# Patient Record
Sex: FEMALE | Race: BLACK OR AFRICAN AMERICAN | NOT HISPANIC OR LATINO | Employment: FULL TIME | URBAN - METROPOLITAN AREA
[De-identification: names, ages, dates, MRNs, and addresses within clinical notes are randomized per-mention and may not be internally consistent; named-entity substitution may affect disease eponyms.]

---

## 2023-12-03 ASSESSMENT — PROMIS GLOBAL HEALTH SCALE
RATE_AVERAGE_PAIN: 8
EMOTIONAL_PROBLEMS: OFTEN
RATE_SOCIAL_SATISFACTION: GOOD
RATE_PHYSICAL_HEALTH: GOOD
RATE_AVERAGE_FATIGUE: MODERATE
CARRYOUT_PHYSICAL_ACTIVITIES: MODERATELY
RATE_GENERAL_HEALTH: FAIR
CARRYOUT_SOCIAL_ACTIVITIES: VERY GOOD
RATE_MENTAL_HEALTH: FAIR
RATE_QUALITY_OF_LIFE: VERY GOOD

## 2023-12-05 ENCOUNTER — OFFICE VISIT (OUTPATIENT)
Dept: PRIMARY CARE | Facility: CLINIC | Age: 43
End: 2023-12-05
Payer: COMMERCIAL

## 2023-12-05 VITALS
SYSTOLIC BLOOD PRESSURE: 137 MMHG | WEIGHT: 283.5 LBS | RESPIRATION RATE: 16 BRPM | HEIGHT: 69 IN | DIASTOLIC BLOOD PRESSURE: 72 MMHG | HEART RATE: 70 BPM | OXYGEN SATURATION: 100 % | TEMPERATURE: 97.8 F | BODY MASS INDEX: 41.99 KG/M2

## 2023-12-05 DIAGNOSIS — Z87.09 HISTORY OF ASTHMA: ICD-10-CM

## 2023-12-05 DIAGNOSIS — G89.29 CHRONIC MIDLINE LOW BACK PAIN WITHOUT SCIATICA: ICD-10-CM

## 2023-12-05 DIAGNOSIS — Z83.79 FAMILY HISTORY OF GERD: ICD-10-CM

## 2023-12-05 DIAGNOSIS — Z86.19 HISTORY OF HERPES GENITALIS: Primary | ICD-10-CM

## 2023-12-05 DIAGNOSIS — M54.50 CHRONIC MIDLINE LOW BACK PAIN WITHOUT SCIATICA: ICD-10-CM

## 2023-12-05 PROCEDURE — 99214 OFFICE O/P EST MOD 30 MIN: CPT | Performed by: STUDENT IN AN ORGANIZED HEALTH CARE EDUCATION/TRAINING PROGRAM

## 2023-12-05 PROCEDURE — 90471 IMMUNIZATION ADMIN: CPT | Performed by: STUDENT IN AN ORGANIZED HEALTH CARE EDUCATION/TRAINING PROGRAM

## 2023-12-05 PROCEDURE — 1036F TOBACCO NON-USER: CPT | Performed by: STUDENT IN AN ORGANIZED HEALTH CARE EDUCATION/TRAINING PROGRAM

## 2023-12-05 PROCEDURE — 90686 IIV4 VACC NO PRSV 0.5 ML IM: CPT | Performed by: STUDENT IN AN ORGANIZED HEALTH CARE EDUCATION/TRAINING PROGRAM

## 2023-12-05 RX ORDER — FAMOTIDINE 20 MG/1
20 TABLET, FILM COATED ORAL 2 TIMES DAILY
Qty: 120 TABLET | Refills: 3 | Status: SHIPPED | OUTPATIENT
Start: 2023-12-05

## 2023-12-05 RX ORDER — VALACYCLOVIR HYDROCHLORIDE 500 MG/1
500 TABLET, FILM COATED ORAL DAILY
Qty: 30 TABLET | Refills: 11 | Status: SHIPPED | OUTPATIENT
Start: 2023-12-05

## 2023-12-05 RX ORDER — ALBUTEROL SULFATE 0.83 MG/ML
2.5 SOLUTION RESPIRATORY (INHALATION) 4 TIMES DAILY PRN
Qty: 3 ML | Refills: 3 | Status: SHIPPED | OUTPATIENT
Start: 2023-12-05 | End: 2024-12-04

## 2023-12-05 RX ORDER — FLUTICASONE PROPIONATE AND SALMETEROL 500; 50 UG/1; UG/1
1 POWDER RESPIRATORY (INHALATION)
Qty: 60 EACH | Refills: 11 | Status: SHIPPED | OUTPATIENT
Start: 2023-12-05 | End: 2024-12-04

## 2023-12-05 RX ORDER — NAPROXEN 500 MG/1
500 TABLET ORAL 2 TIMES DAILY PRN
Qty: 60 TABLET | Refills: 0 | Status: SHIPPED | OUTPATIENT
Start: 2023-12-05 | End: 2024-03-04

## 2023-12-05 ASSESSMENT — PAIN SCALES - GENERAL: PAINLEVEL: 6

## 2023-12-05 NOTE — PROGRESS NOTES
I saw and evaluated the patient. I personally obtained the key and critical portions of the history and physical exam or was physically present for key and critical portions performed by the resident/fellow. I reviewed the resident/fellow's documentation and discussed the patient with the resident/fellow. I agree with the resident/fellow's medical decision making as documented in the note.    Karl Pina MD

## 2023-12-05 NOTE — PROGRESS NOTES
"Subjective   Patient ID: Mariposa Singh is a 43 y.o. female with PMH of genital herpes, GERD, asthma, obesity, IBS who presents for medication refill, flu vaccine, groin pain, back pain, and request for nebulizer.       HPI     #Groin pain  First diagnosed in 2010 with genital herpes and has been taking Valtrex regularly, but will have outbreak when she runs out of medications or feels stressed. Says that normally around this time of year, always gets outbreak of her genital herpes. She sometimes needs to go to the ED for treatment because she has run out of Valtrex. She ran out of Valtrex for this past month. Currently sore and painful on the groin, which just started yesterday. She notes that it feels just like previous times of herpes outbreak. Normally soreness lasts a couple days before the rest of the symptoms start. She would like refill on her valtrex.     #Back pain  Has been feeling a tight pain in the thoracic spine area (from top to middle of back) for the last 6 months. Rates pain 5/10, nonradiating. She will attempt to \"pop\" her back by laying on bed with top half hanging off of bed and stretching upwards. She sometimes feels a lot of pain when it pops, but other times feels relief after it pops. She has taken tylenol but to no relief. Tip of fingers feel numb around same time as start of back pain, left shoulder feels sore sometimes. Patient does not previous fracturing of left wrist. Denies changes to sensation no tingling.     #Asthma  Takes albuterol daily and Symbicort for asthma exacerbations. Sometimes uses son or brother's nebulizer machine but has never had one of her own- was always given the medication without the machine. Says that she would like a nebulizer machine because during visits to the ED for asthma attacks, that is how they have treated her.       #GERD  Would like refill of famotidine     #Health Maintenance  Would like flu vaccination today       Review of Systems  Negative aside " "from those in HPI.     Objective   /72 (BP Location: Right arm, Patient Position: Sitting, BP Cuff Size: Adult)   Pulse 70   Temp 36.6 °C (97.8 °F) (Temporal)   Resp 16   Ht 1.753 m (5' 9\")   Wt 129 kg (283 lb 8 oz)   SpO2 100%   BMI 41.87 kg/m²       Physical Exam  Constitutional:       Appearance: Normal appearance. She is obese.   Cardiovascular:      Rate and Rhythm: Normal rate and regular rhythm.      Pulses: Normal pulses.      Heart sounds: Normal heart sounds.   Pulmonary:      Effort: Pulmonary effort is normal.      Breath sounds: Normal breath sounds.   Musculoskeletal:         General: Normal range of motion in spine.      Pain to palpation in the mid upper spine (T spine area)   Skin:     Capillary Refill: Capillary refill takes less than 2 seconds.   Neurological:      Mental Status: She is alert.      Sensation to light touch intact in all fingers     Negative phalen's test bilaterally. Tinel's test positive in left hand (patient says it \"feels weird\")     Assessment/Plan   Mariposa Singh is a 43 y.o. female with PMH of genital herpes, GERD, asthma, obesity, IBS who has been having some groin and back pain and would like medication refills. Groin pain seems to be consistent with genital herpes outbreak, as she has run out of Valtrex for the past month and feels that the symptoms are similar to her previous outbreaks. Back pain may be degenerative disc vs disc herniation vs related to body habitus. Due to symptoms of finger numbness, imaging will be necessary to determine possible nerve compression. However, other differentials for finger numbness may include undiagnosed diabetes or tendinitis (previously was diagnosed in right wrist, and Tinel's test positive in left today although phalen's was negative). Patient was counseled on appropriate asthma medication use (using symbicort/advair daily and for exacerbations, and no longer taking albuterol).     Diagnoses and all orders for this " visit:    #Herpes Genitalis  -     continue with valACYclovir (Valtrex) 500 mg tablet; Take 1 tablet (500 mg) by mouth once daily.  Refilled today     #Asthma  Mild persistent, stable, chronic   -     Start fluticasone propion-salmeteroL (Advair Diskus) 500-50 mcg/dose diskus inhaler; Inhale 1 puff 2 times a day. Rinse mouth with water after use to reduce aftertaste and incidence of candidiasis. Do not swallow.  Advair to be used as controller and reliever medication   -     albuterol 2.5 mg /3 mL (0.083 %) nebulizer solution; Take 3 mL (2.5 mg) by nebulization 4 times a day as needed for wheezing or shortness of breath. (Refilled today)  -     miscellaneous medical supply misc; 1 each once daily as needed (for shortness of breathing and wheezing). Use nebulizer machine very 6 hours as needed for asthma symptom  Medically indicated for patient to use nebulizer machine for management of symptoms     #Upper Back Pain  Sub acute, uncontrolled  -     Start naproxen (Naprosyn) 500 mg tablet; Take 1 tablet (500 mg) by mouth 2 times a day as needed for mild pain (1 - 3) (pain).  -     XR cervical spine complete 4-5 views; Future  -     XR thoracic spine complete 4+ views; Future    #GERD  -     Continue with famotidine (Pepcid) 20 mg tablet; Take 1 tablet (20 mg) by mouth 2 times a day.  Refilled today     #HM  -     Flu vaccine administered today      Britt Pineda, MS3    I saw and evaluated the patient. I personally obtained the key and critical portions of the history and physical exam. I reviewed and modified the student's documentation of the HPI and discussed the patient with the medical student. I agree with the above documentation of the HPI.    Patient seen and discussed with attending, Dr. Silvana Fernandez MD  Family Medicine, PGY-3

## 2024-01-19 ENCOUNTER — APPOINTMENT (OUTPATIENT)
Dept: PRIMARY CARE | Facility: CLINIC | Age: 44
End: 2024-01-19
Payer: COMMERCIAL

## 2024-11-09 ENCOUNTER — APPOINTMENT (OUTPATIENT)
Dept: RADIOLOGY | Facility: HOSPITAL | Age: 44
End: 2024-11-09
Payer: COMMERCIAL

## 2024-11-09 ENCOUNTER — HOSPITAL ENCOUNTER (EMERGENCY)
Facility: HOSPITAL | Age: 44
Discharge: HOME | End: 2024-11-10
Attending: STUDENT IN AN ORGANIZED HEALTH CARE EDUCATION/TRAINING PROGRAM
Payer: COMMERCIAL

## 2024-11-09 ENCOUNTER — CLINICAL SUPPORT (OUTPATIENT)
Dept: EMERGENCY MEDICINE | Facility: HOSPITAL | Age: 44
End: 2024-11-09
Payer: COMMERCIAL

## 2024-11-09 VITALS
RESPIRATION RATE: 18 BRPM | SYSTOLIC BLOOD PRESSURE: 130 MMHG | DIASTOLIC BLOOD PRESSURE: 89 MMHG | OXYGEN SATURATION: 100 % | TEMPERATURE: 97 F | HEART RATE: 82 BPM

## 2024-11-09 DIAGNOSIS — U07.1 COVID-19: Primary | ICD-10-CM

## 2024-11-09 DIAGNOSIS — J45.901 EXACERBATION OF ASTHMA, UNSPECIFIED ASTHMA SEVERITY, UNSPECIFIED WHETHER PERSISTENT (HHS-HCC): ICD-10-CM

## 2024-11-09 PROCEDURE — 94640 AIRWAY INHALATION TREATMENT: CPT

## 2024-11-09 PROCEDURE — 99285 EMERGENCY DEPT VISIT HI MDM: CPT | Mod: 25

## 2024-11-09 PROCEDURE — 93005 ELECTROCARDIOGRAM TRACING: CPT

## 2024-11-09 PROCEDURE — 2500000002 HC RX 250 W HCPCS SELF ADMINISTERED DRUGS (ALT 637 FOR MEDICARE OP, ALT 636 FOR OP/ED): Mod: SE | Performed by: EMERGENCY MEDICINE

## 2024-11-09 PROCEDURE — 99285 EMERGENCY DEPT VISIT HI MDM: CPT

## 2024-11-09 RX ORDER — PREDNISONE 20 MG/1
40 TABLET ORAL ONCE
Status: COMPLETED | OUTPATIENT
Start: 2024-11-09 | End: 2024-11-10

## 2024-11-09 RX ORDER — ALBUTEROL SULFATE 0.83 MG/ML
2.5 SOLUTION RESPIRATORY (INHALATION) ONCE
Status: COMPLETED | OUTPATIENT
Start: 2024-11-09 | End: 2024-11-09

## 2024-11-09 RX ORDER — IPRATROPIUM BROMIDE AND ALBUTEROL SULFATE 2.5; .5 MG/3ML; MG/3ML
3 SOLUTION RESPIRATORY (INHALATION)
Status: COMPLETED | OUTPATIENT
Start: 2024-11-09 | End: 2024-11-10

## 2024-11-09 RX ADMIN — ALBUTEROL SULFATE 2.5 MG: 2.5 SOLUTION RESPIRATORY (INHALATION) at 23:15

## 2024-11-09 ASSESSMENT — LIFESTYLE VARIABLES
HAVE PEOPLE ANNOYED YOU BY CRITICIZING YOUR DRINKING: NO
HAVE YOU EVER FELT YOU SHOULD CUT DOWN ON YOUR DRINKING: NO
EVER FELT BAD OR GUILTY ABOUT YOUR DRINKING: NO
TOTAL SCORE: 0
EVER HAD A DRINK FIRST THING IN THE MORNING TO STEADY YOUR NERVES TO GET RID OF A HANGOVER: NO

## 2024-11-09 ASSESSMENT — COLUMBIA-SUICIDE SEVERITY RATING SCALE - C-SSRS
2. HAVE YOU ACTUALLY HAD ANY THOUGHTS OF KILLING YOURSELF?: NO
1. IN THE PAST MONTH, HAVE YOU WISHED YOU WERE DEAD OR WISHED YOU COULD GO TO SLEEP AND NOT WAKE UP?: NO
6. HAVE YOU EVER DONE ANYTHING, STARTED TO DO ANYTHING, OR PREPARED TO DO ANYTHING TO END YOUR LIFE?: NO

## 2024-11-09 ASSESSMENT — PAIN - FUNCTIONAL ASSESSMENT: PAIN_FUNCTIONAL_ASSESSMENT: 0-10

## 2024-11-09 ASSESSMENT — PAIN SCALES - GENERAL: PAINLEVEL_OUTOF10: 0 - NO PAIN

## 2024-11-09 NOTE — Clinical Note
Mariposa Singh was seen and treated in our emergency department on 11/9/2024.  She may return to work on 11/11/2024.       If you have any questions or concerns, please don't hesitate to call.      Gulshan Guthrie PA-C

## 2024-11-10 ENCOUNTER — APPOINTMENT (OUTPATIENT)
Dept: RADIOLOGY | Facility: HOSPITAL | Age: 44
End: 2024-11-10
Payer: COMMERCIAL

## 2024-11-10 LAB
ATRIAL RATE: 74 BPM
FLUAV RNA RESP QL NAA+PROBE: NOT DETECTED
FLUBV RNA RESP QL NAA+PROBE: NOT DETECTED
P AXIS: 52 DEGREES
P OFFSET: 197 MS
P ONSET: 138 MS
PR INTERVAL: 160 MS
Q ONSET: 218 MS
QRS COUNT: 12 BEATS
QRS DURATION: 80 MS
QT INTERVAL: 400 MS
QTC CALCULATION(BAZETT): 444 MS
QTC FREDERICIA: 429 MS
R AXIS: 59 DEGREES
SARS-COV-2 RNA RESP QL NAA+PROBE: DETECTED
T AXIS: 25 DEGREES
T OFFSET: 418 MS
VENTRICULAR RATE: 74 BPM

## 2024-11-10 PROCEDURE — 71046 X-RAY EXAM CHEST 2 VIEWS: CPT | Performed by: RADIOLOGY

## 2024-11-10 PROCEDURE — 2500000002 HC RX 250 W HCPCS SELF ADMINISTERED DRUGS (ALT 637 FOR MEDICARE OP, ALT 636 FOR OP/ED): Mod: SE

## 2024-11-10 PROCEDURE — 87636 SARSCOV2 & INF A&B AMP PRB: CPT

## 2024-11-10 PROCEDURE — 71046 X-RAY EXAM CHEST 2 VIEWS: CPT

## 2024-11-10 PROCEDURE — 2500000004 HC RX 250 GENERAL PHARMACY W/ HCPCS (ALT 636 FOR OP/ED): Mod: SE

## 2024-11-10 RX ORDER — DEXAMETHASONE 6 MG/1
6 TABLET ORAL DAILY
Qty: 5 TABLET | Refills: 0 | Status: SHIPPED | OUTPATIENT
Start: 2024-11-10 | End: 2024-11-15

## 2024-11-10 RX ADMIN — IPRATROPIUM BROMIDE AND ALBUTEROL SULFATE 3 ML: .5; 3 SOLUTION RESPIRATORY (INHALATION) at 00:30

## 2024-11-10 RX ADMIN — PREDNISONE 40 MG: 20 TABLET ORAL at 00:14

## 2024-11-10 RX ADMIN — IPRATROPIUM BROMIDE AND ALBUTEROL SULFATE 3 ML: .5; 3 SOLUTION RESPIRATORY (INHALATION) at 00:14

## 2024-11-10 RX ADMIN — IPRATROPIUM BROMIDE AND ALBUTEROL SULFATE 3 ML: .5; 3 SOLUTION RESPIRATORY (INHALATION) at 00:43

## 2024-11-10 NOTE — ED TRIAGE NOTES
Pt woke up with sob and chest pressure with being active, pt is talking in full sentences drove self from work, at work got a nebulizer and had no relief.

## 2024-11-10 NOTE — DISCHARGE INSTRUCTIONS
Please follow-up with your primary care provider as needed.  Prescription for Decadron for improvement of symptoms has been included in discharge paperwork.  Please take medication as prescribed.  Return to the emergency department if shortness of breath returns and worsens, cough worsens, have persistent fevers, or any new symptoms began.

## 2024-11-10 NOTE — ED PROVIDER NOTES
"HPI   Chief Complaint   Patient presents with    Shortness of Breath       HPI    Mariposa Singh is a 44-year-old female with history of asthma presenting the ED with shortness of breath.  Patient states the shortness of breath began this morning when she woke up.  Patient states she took a nebulizer treatment at home and the shortness of breath did not improve.  Patient states at work around 7 PM today she began having shortness of breath again as well as a dry cough.  Patient states she was given a nebulizer treatment at work which did improve the shortness of breath and cough.  However patient states while in the ED she began having shortness of breath and dry cough again.  Patient states she has \"chest pressure\" with coughing.  Patient denies abdominal pain, nausea, vomiting, fevers, body aches, chills, lower extremity swelling,  Sore throat, lower extremity swelling.    Patient History   Past Medical History:   Diagnosis Date    Chlamydial infection, unspecified     Chlamydia    Encounter for follow-up examination after completed treatment for conditions other than malignant neoplasm 02/05/2018    Postop check    Encounter for gynecological examination (general) (routine) without abnormal findings 10/30/2017    Encounter for annual routine gynecological examination    Encounter for immunization 07/21/2013    Need for DTP vaccine    Encounter for other preprocedural examination 12/21/2017    Pre-op evaluation    Inconclusive mammogram 10/21/2021    Inconclusive mammogram    Marginal corneal ulcer, unspecified eye 09/13/2019    Corneal ulcer, marginal    Other abnormal and inconclusive findings on diagnostic imaging of breast 07/11/2022    Abnormal mammogram of left breast    Other conditions influencing health status     Termination of pregnancy    Other conditions influencing health status     History of pregnancy    Other specified disorders of eye and adnexa     Red eyes    Other specified postprocedural " states 03/06/2018    Postoperative state    Pain in left shoulder 02/24/2014    Pain in joint of left shoulder    Personal history of diseases of the blood and blood-forming organs and certain disorders involving the immune mechanism 12/11/2017    History of anemia    Personal history of other benign neoplasm 12/21/2017    History of uterine leiomyoma    Personal history of other diseases of the digestive system     History of gastroesophageal reflux (GERD)    Personal history of other diseases of the female genital tract 10/30/2017    History of breast pain    Personal history of other diseases of the female genital tract 09/13/2019    History of menorrhagia    Personal history of other diseases of the female genital tract 09/06/2022    History of breast pain    Personal history of other diseases of the nervous system and sense organs 03/10/2015    History of corneal ulcer    Personal history of other diseases of the nervous system and sense organs 04/14/2020    History of conjunctivitis    Personal history of other diseases of the respiratory system 10/19/2017    Personal history of asthma    Personal history of other endocrine, nutritional and metabolic disease     History of obesity    Personal history of other specified conditions     History of abnormal Pap smear    Personal history of other specified conditions 09/13/2019    History of headache    Personal history of other specified conditions 07/26/2022    History of breast lump    Unspecified acute conjunctivitis, right eye 04/14/2020    Acute bacterial conjunctivitis of right eye    Unspecified blepharitis right eye, unspecified eyelid 03/10/2015    Blepharitis of both eyes    Unspecified corneal ulcer, right eye 09/13/2019    Corneal ulcer of right eye    Unspecified fall, initial encounter 10/12/2022    Accidental fall, initial encounter     Past Surgical History:   Procedure Laterality Date    DILATION AND CURETTAGE OF UTERUS  10/30/2017    Dilation And  Curettage    OTHER SURGICAL HISTORY  2014    Dental Surgery    OTHER SURGICAL HISTORY  10/30/2017    Surgically Induced  - By Dilation And Curettage    OTHER SURGICAL HISTORY  2018    Laparoscopy With Total Hysterectomy    TUBAL LIGATION  2014    Tubal Ligation     No family history on file.  Social History     Tobacco Use    Smoking status: Never    Smokeless tobacco: Never   Substance Use Topics    Alcohol use: Yes    Drug use: Never       Physical Exam   ED Triage Vitals [24 2301]   Temperature Heart Rate Respirations BP   36.1 °C (97 °F) 82 18 130/89      Pulse Ox Temp Source Heart Rate Source Patient Position   100 % Temporal Monitor Sitting      BP Location FiO2 (%)     Left arm --       Physical Exam  Vitals and nursing note reviewed.   Constitutional:       Appearance: Normal appearance.   Cardiovascular:      Rate and Rhythm: Normal rate and regular rhythm.      Heart sounds: Normal heart sounds. No murmur heard.     No gallop.   Pulmonary:      Effort: Pulmonary effort is normal. No respiratory distress.      Breath sounds: No stridor. Wheezing (Bilateral expiratory wheezing) present. No rhonchi or rales.   Chest:      Chest wall: No tenderness.   Abdominal:      General: Abdomen is flat. Bowel sounds are normal. There is no distension.      Palpations: Abdomen is soft. There is no mass.      Tenderness: There is no abdominal tenderness. There is no guarding or rebound.      Hernia: No hernia is present.   Musculoskeletal:      Right lower leg: No edema.      Left lower leg: No edema.   Skin:     General: Skin is warm and dry.   Neurological:      General: No focal deficit present.      Mental Status: She is alert and oriented to person, place, and time.   Psychiatric:         Mood and Affect: Mood normal.         Behavior: Behavior normal.         ED Course & MDM   ED Course as of 11/10/24 1723   Sun Nov 10, 2024   0009 44-year-old female presenting for shortness of breath  "and a cough today.  No recent travel, no sick contacts, does have a history of asthma.  Reports nasal congestion, chest congestion, and chest pain that is worse with coughing.  On exam she has faint expiratory wheezing in the bilateral lung fields, heart rate is regular rate and rhythm, chest wall is reproducibly tender to palpation.  Evidence of URI on exam.  Workup will be undertaken with a chest x-ray, do not feel labs are indicated at this time as my suspicion is for a viral versus bacterial pneumonia, URI, and an asthma exacerbation.  No evidence of heart failure, low suspicion for ACS. [NS]      ED Course User Index  [NS] Jamal Patel MD         Diagnoses as of 11/10/24 1723   COVID-19   Exacerbation of asthma, unspecified asthma severity, unspecified whether persistent ([facility])                 No data recorded     [place] Coma Scale Score: 15 (11/09/24 2303 : Destiny Hartley RN)                           Medical Decision Making  With history of asthma presenting to the ED with shortness of breath.  Patient states the shortness of breath began this morning when she woke up and improved after using a breathing treatment.  However patient states at work around 7 PM she began having shortness of breath as well as dry cough.  Patient states she was given nebulizer treatment at work which did improve the shortness of breath.  Patient states the shortness of breath and cough began again while in the ED.  Upon exam patient has bilateral expiratory wheezing.  No rhonchi or rales.  Patient was given DuoNeb treatments as well as prednisone for asthma saturation.  Chest x-ray obtained for further evaluation of shortness of breath and to rule out pneumonia.  COVID and influenza swabs were obtained to rule out causes of asthma exacerbation.  Patient states she has \"chest pressure\" with coughing.  Given the patient has the chest pressure with coughing this is most likely due to muscle skeletal pain from " coughing.  There is low suspicion for ACS at this time if troponins were not obtained.  EKG was obtained for further evaluation of the chest pressure.  Patient denies swelling to the bilateral lower legs and no swelling or edema to the bilateral lower legs on exam.  There is low suspicion for heart failure exacerbation at this time therefore BNP was not obtained. Chest x-ray shows no acute cardiopulmonary pathology.  COVID test is positive.  Discussed results with the patient.  Patient states her shortness of breath and cough did improve after receiving prednisone and DuoNeb treatments.  Discussed with patient she will be given prescription for dexamethasone to use daily for 5 days for improvement of symptoms from asthma exacerbation and COVID-19.  Patient has been hemodynamically stable in the ED today.    EKG was obtained today for chest pressure.  EKG shows normal sinus rhythm with regular rate of 74 bpm, CT interval 160, QRS 80, QTc 444, normal axis deviation.  No ST elevations noted.    Disposition: Discharge home  Patient was advised to follow-up with primary care provider.  Prescription for Decadron has been included in discharge paperwork.  Patient was advised to take the medications as prescribed.  Strict return precautions were given.  Plan was discussed with the patient and the patient understands and agrees.  Patient was discharged in stable condition.    Patient was discussed and staffed with Dr. Patel    Procedure  Procedures     Gulshan Guthrie PA-C  11/10/24 2288

## 2024-11-11 DIAGNOSIS — Z00.6 RESEARCH EXAM: ICD-10-CM

## 2024-11-13 ENCOUNTER — APPOINTMENT (OUTPATIENT)
Dept: LAB | Facility: LAB | Age: 44
End: 2024-11-13

## 2024-11-14 ENCOUNTER — APPOINTMENT (OUTPATIENT)
Dept: LAB | Facility: LAB | Age: 44
End: 2024-11-14

## 2025-02-14 ENCOUNTER — HOSPITAL ENCOUNTER (EMERGENCY)
Facility: HOSPITAL | Age: 45
Discharge: HOME | End: 2025-02-15
Attending: EMERGENCY MEDICINE
Payer: COMMERCIAL

## 2025-02-14 ENCOUNTER — APPOINTMENT (OUTPATIENT)
Dept: RADIOLOGY | Facility: HOSPITAL | Age: 45
End: 2025-02-14
Payer: COMMERCIAL

## 2025-02-14 DIAGNOSIS — M25.552 ACUTE PAIN OF LEFT HIP: Primary | ICD-10-CM

## 2025-02-14 PROCEDURE — 99284 EMERGENCY DEPT VISIT MOD MDM: CPT | Performed by: EMERGENCY MEDICINE

## 2025-02-14 RX ORDER — TIZANIDINE 4 MG/1
4 TABLET ORAL ONCE
Status: COMPLETED | OUTPATIENT
Start: 2025-02-15 | End: 2025-02-15

## 2025-02-14 RX ORDER — IBUPROFEN 600 MG/1
600 TABLET ORAL ONCE
Status: COMPLETED | OUTPATIENT
Start: 2025-02-15 | End: 2025-02-15

## 2025-02-14 ASSESSMENT — COLUMBIA-SUICIDE SEVERITY RATING SCALE - C-SSRS
6. HAVE YOU EVER DONE ANYTHING, STARTED TO DO ANYTHING, OR PREPARED TO DO ANYTHING TO END YOUR LIFE?: NO
2. HAVE YOU ACTUALLY HAD ANY THOUGHTS OF KILLING YOURSELF?: NO
1. IN THE PAST MONTH, HAVE YOU WISHED YOU WERE DEAD OR WISHED YOU COULD GO TO SLEEP AND NOT WAKE UP?: NO

## 2025-02-14 ASSESSMENT — LIFESTYLE VARIABLES
EVER FELT BAD OR GUILTY ABOUT YOUR DRINKING: NO
HAVE PEOPLE ANNOYED YOU BY CRITICIZING YOUR DRINKING: NO
HAVE YOU EVER FELT YOU SHOULD CUT DOWN ON YOUR DRINKING: NO
EVER HAD A DRINK FIRST THING IN THE MORNING TO STEADY YOUR NERVES TO GET RID OF A HANGOVER: NO
TOTAL SCORE: 0

## 2025-02-14 NOTE — Clinical Note
Mariposa Singh was seen and treated in our emergency department on 2/14/2025.  She may return to work on 02/17/2025.       If you have any questions or concerns, please don't hesitate to call.      Nadeem Harding MD

## 2025-02-15 ENCOUNTER — APPOINTMENT (OUTPATIENT)
Dept: RADIOLOGY | Facility: HOSPITAL | Age: 45
End: 2025-02-15
Payer: COMMERCIAL

## 2025-02-15 VITALS
DIASTOLIC BLOOD PRESSURE: 79 MMHG | TEMPERATURE: 98.2 F | RESPIRATION RATE: 16 BRPM | OXYGEN SATURATION: 96 % | HEART RATE: 72 BPM | SYSTOLIC BLOOD PRESSURE: 138 MMHG

## 2025-02-15 PROCEDURE — 2500000001 HC RX 250 WO HCPCS SELF ADMINISTERED DRUGS (ALT 637 FOR MEDICARE OP)

## 2025-02-15 PROCEDURE — 73521 X-RAY EXAM HIPS BI 2 VIEWS: CPT

## 2025-02-15 PROCEDURE — 2500000002 HC RX 250 W HCPCS SELF ADMINISTERED DRUGS (ALT 637 FOR MEDICARE OP, ALT 636 FOR OP/ED)

## 2025-02-15 PROCEDURE — 72100 X-RAY EXAM L-S SPINE 2/3 VWS: CPT | Performed by: STUDENT IN AN ORGANIZED HEALTH CARE EDUCATION/TRAINING PROGRAM

## 2025-02-15 PROCEDURE — 72100 X-RAY EXAM L-S SPINE 2/3 VWS: CPT

## 2025-02-15 RX ORDER — TIZANIDINE 4 MG/1
4 TABLET ORAL DAILY
Status: DISCONTINUED | OUTPATIENT
Start: 2025-02-15 | End: 2025-02-15 | Stop reason: HOSPADM

## 2025-02-15 RX ADMIN — TIZANIDINE 4 MG: 4 TABLET ORAL at 00:04

## 2025-02-15 RX ADMIN — IBUPROFEN 600 MG: 600 TABLET, FILM COATED ORAL at 00:04

## 2025-02-15 NOTE — ED PROVIDER NOTES
HPI   Chief Complaint   Patient presents with   • Hip Pain       Mariposa Singh is a 44 y.o. female with no significant PMH presenting for L hip pain. She reports reaching into her car while standing outside of the doorway and felt a sudden pop followed by shooting electric pain in the posterior-lateral leg leg that radiates down the leg to the ankle. She also felt numbness over the same area. Pain is 5-7/10 and makes walking/moving difficult. She did not lose bowel or bladder function nor does she endorse any saddle paresthesias. She did not fall or sustain any trauma to the leg nor hip. This has never happened before.      History provided by:  Patient          Patient History   Past Medical History:   Diagnosis Date   • Chlamydial infection, unspecified     Chlamydia   • Encounter for follow-up examination after completed treatment for conditions other than malignant neoplasm 02/05/2018    Postop check   • Encounter for gynecological examination (general) (routine) without abnormal findings 10/30/2017    Encounter for annual routine gynecological examination   • Encounter for immunization 07/21/2013    Need for DTP vaccine   • Encounter for other preprocedural examination 12/21/2017    Pre-op evaluation   • Inconclusive mammogram 10/21/2021    Inconclusive mammogram   • Marginal corneal ulcer, unspecified eye 09/13/2019    Corneal ulcer, marginal   • Other abnormal and inconclusive findings on diagnostic imaging of breast 07/11/2022    Abnormal mammogram of left breast   • Other conditions influencing health status     Termination of pregnancy   • Other conditions influencing health status     History of pregnancy   • Other specified disorders of eye and adnexa     Red eyes   • Other specified postprocedural states 03/06/2018    Postoperative state   • Pain in left shoulder 02/24/2014    Pain in joint of left shoulder   • Personal history of diseases of the blood and blood-forming organs and certain disorders  involving the immune mechanism 2017    History of anemia   • Personal history of other benign neoplasm 2017    History of uterine leiomyoma   • Personal history of other diseases of the digestive system     History of gastroesophageal reflux (GERD)   • Personal history of other diseases of the female genital tract 10/30/2017    History of breast pain   • Personal history of other diseases of the female genital tract 2019    History of menorrhagia   • Personal history of other diseases of the female genital tract 2022    History of breast pain   • Personal history of other diseases of the nervous system and sense organs 03/10/2015    History of corneal ulcer   • Personal history of other diseases of the nervous system and sense organs 2020    History of conjunctivitis   • Personal history of other diseases of the respiratory system 10/19/2017    Personal history of asthma   • Personal history of other endocrine, nutritional and metabolic disease     History of obesity   • Personal history of other specified conditions     History of abnormal Pap smear   • Personal history of other specified conditions 2019    History of headache   • Personal history of other specified conditions 2022    History of breast lump   • Unspecified acute conjunctivitis, right eye 2020    Acute bacterial conjunctivitis of right eye   • Unspecified blepharitis right eye, unspecified eyelid 03/10/2015    Blepharitis of both eyes   • Unspecified corneal ulcer, right eye 2019    Corneal ulcer of right eye   • Unspecified fall, initial encounter 10/12/2022    Accidental fall, initial encounter     Past Surgical History:   Procedure Laterality Date   • DILATION AND CURETTAGE OF UTERUS  10/30/2017    Dilation And Curettage   • OTHER SURGICAL HISTORY  2014    Dental Surgery   • OTHER SURGICAL HISTORY  10/30/2017    Surgically Induced  - By Dilation And Curettage   • OTHER SURGICAL  HISTORY  03/06/2018    Laparoscopy With Total Hysterectomy   • TUBAL LIGATION  02/21/2014    Tubal Ligation     No family history on file.  Social History     Tobacco Use   • Smoking status: Never   • Smokeless tobacco: Never   Substance Use Topics   • Alcohol use: Yes   • Drug use: Never       Physical Exam   ED Triage Vitals [02/14/25 2332]   Temperature Heart Rate Respirations BP   36.8 °C (98.2 °F) 75 16 125/80      Pulse Ox Temp Source Heart Rate Source Patient Position   95 % Oral -- --      BP Location FiO2 (%)     Left arm --       Physical Exam  Vitals reviewed.   Constitutional:       General: She is not in acute distress.     Appearance: Normal appearance. She is obese. She is not ill-appearing.      Comments: Pt appears stated age, looks acutely in pain   HENT:      Head: Normocephalic and atraumatic.      Mouth/Throat:      Mouth: Mucous membranes are moist.   Eyes:      General: No scleral icterus.     Extraocular Movements: Extraocular movements intact.      Conjunctiva/sclera: Conjunctivae normal.   Cardiovascular:      Rate and Rhythm: Normal rate and regular rhythm.      Pulses: Normal pulses.      Heart sounds: Normal heart sounds. No murmur heard.     Comments: Posterior tibialis pulses present bilaterally  Pulmonary:      Effort: Pulmonary effort is normal. No respiratory distress.      Breath sounds: Normal breath sounds. No stridor. No wheezing, rhonchi or rales.   Musculoskeletal:         General: Tenderness present.      Cervical back: No tenderness.      Comments: Tenderness in the superior left buttocks. Palpation causes electrical radiating pain down the posterior-lateral L leg    R leg motor exam 5/5 throughout. L leg motor exam 3/5, limited by pain (hip flexion/abduction/adduction, knee flexion/extension, plantar flexion/extension)    Positive L leg straight leg test    Negative cross leg flexion reflex   Skin:     General: Skin is warm and dry.      Capillary Refill: Capillary refill  takes less than 2 seconds.   Neurological:      Mental Status: She is alert and oriented to person, place, and time.      Gait: Gait abnormal.      Comments: Antalgic gait    Sensation intact on the R leg; numbness across the posterior-lateral L leg present   Psychiatric:         Mood and Affect: Mood normal.         Behavior: Behavior normal.         ED Course & [facility]   ED Course as of 02/15/25 0147   Sat Feb 15, 2025   0006 ATTENDING ATTESTATION  44-year-old female otherwise healthy presenting to the emergency department with sudden onset of left sided hip pain after she felt a popping sensation while twisting her body trying to get something out of a car that occurred just prior to arrival.  Denies any falls or injuries otherwise, no history of vascular pathology.  The patient is well-appearing at the bedside appears uncomfortable, has a positive ipsilateral straight leg test on the left negative contralateral cross flexor reflex.  She has some subjective sensation loss in the left anterolateral thigh compartment, has some weakness compared to the contralateral right side but appears to be volitional and pain driven particularly with hip flexion.  Patient has good pulses bilaterally warm and well-perfused extremities.  There is no sign of septic arthritis on my examination and the vitals are stable.  Patient in fact has no midline lumbar tenderness or even paralumbar pain, her point of maximal pain is over the left lateral hip area.  We will start with plain films, treat the patient and reassess the patient's clinical response to therapy.  My clinical concern for herniated disc with motor compromise low given the negative cross flexor reflex, we will assess patient's response, consider MRI if she does not improve.  [facility] [RH]      ED Course User Index  [RH] Hernán Jack DO         Diagnoses as of 02/15/25 0147   Acute pain of left hip                 No data recorded     [place] Coma Scale Score: 15 (02/14/25  2342 : Sriram Bose RN)                           Medical Decision Making  Pt presenting with L MSK hip pain. History and exam points towards lumbar spine disc herniation. Xray bilateral hips and lumbar spine negative for acute osseous abnormalities. She was given 4mg tizanidine and 600mg ibuprofen and subjectively her pain improved. Left leg motor exam improved; 4/5 strength throughout. No concern for acute cord compression, so deferring MRI. Will send home with short course of tizanidine and recommend follow-up with PCP for further management.        Procedure  Procedures     Nadeem Harding MD  Resident  02/15/25 0152

## 2025-02-15 NOTE — ED TRIAGE NOTES
Pt came in via EMS for left hip pain. Pt states that she was reaching for something in the back of her car witch resulted in her left hip popping. Pt endorses posterior hamstring/glute numbness, anterior and lateral hip pain.

## 2025-02-15 NOTE — DISCHARGE INSTRUCTIONS
Please follow up with your primary care physician within 2 weeks.    Please return to the emergency department if you have worsening pain, weakness, loss of bowel or bladder function (incontinence), or you begin to have numbness in your perineal area.